# Patient Record
Sex: MALE | Race: WHITE | NOT HISPANIC OR LATINO | Employment: OTHER | URBAN - METROPOLITAN AREA
[De-identification: names, ages, dates, MRNs, and addresses within clinical notes are randomized per-mention and may not be internally consistent; named-entity substitution may affect disease eponyms.]

---

## 2019-01-09 ENCOUNTER — TELEPHONE (OUTPATIENT)
Dept: GASTROENTEROLOGY | Facility: CLINIC | Age: 69
End: 2019-01-09

## 2022-02-23 ENCOUNTER — OFFICE VISIT (OUTPATIENT)
Dept: FAMILY MEDICINE CLINIC | Facility: CLINIC | Age: 72
End: 2022-02-23
Payer: COMMERCIAL

## 2022-02-23 VITALS
TEMPERATURE: 98.5 F | DIASTOLIC BLOOD PRESSURE: 88 MMHG | WEIGHT: 226 LBS | SYSTOLIC BLOOD PRESSURE: 140 MMHG | HEART RATE: 78 BPM | OXYGEN SATURATION: 93 %

## 2022-02-23 DIAGNOSIS — F41.9 ANXIETY: Primary | ICD-10-CM

## 2022-02-23 DIAGNOSIS — R26.89 FUNCTIONAL GAIT ABNORMALITY: ICD-10-CM

## 2022-02-23 DIAGNOSIS — G47.00 INSOMNIA, UNSPECIFIED TYPE: ICD-10-CM

## 2022-02-23 PROCEDURE — 3725F SCREEN DEPRESSION PERFORMED: CPT | Performed by: FAMILY MEDICINE

## 2022-02-23 PROCEDURE — 99203 OFFICE O/P NEW LOW 30 MIN: CPT | Performed by: FAMILY MEDICINE

## 2022-02-23 PROCEDURE — 1036F TOBACCO NON-USER: CPT | Performed by: FAMILY MEDICINE

## 2022-02-23 PROCEDURE — 1160F RVW MEDS BY RX/DR IN RCRD: CPT | Performed by: FAMILY MEDICINE

## 2022-02-23 RX ORDER — ZOLPIDEM TARTRATE 10 MG/1
10 TABLET ORAL
COMMUNITY

## 2022-02-23 RX ORDER — GABAPENTIN 400 MG/1
CAPSULE ORAL
COMMUNITY

## 2022-02-23 RX ORDER — HYDROCODONE BITARTRATE AND ACETAMINOPHEN 10; 325 MG/1; MG/1
TABLET ORAL
COMMUNITY
Start: 2022-01-31
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
## 2022-05-28 ENCOUNTER — HOSPITAL ENCOUNTER (OUTPATIENT)
Dept: RADIOLOGY | Facility: HOSPITAL | Age: 72
Discharge: HOME | End: 2022-05-28

## 2022-05-28 ENCOUNTER — OFFICE VISIT (OUTPATIENT)
Dept: OBGYN CLINIC | Facility: CLINIC | Age: 72
End: 2022-05-28
Payer: COMMERCIAL

## 2022-05-28 VITALS — HEIGHT: 72 IN | BODY MASS INDEX: 28.53 KG/M2 | WEIGHT: 210.6 LBS | HEART RATE: 60 BPM | OXYGEN SATURATION: 99 %

## 2022-05-28 DIAGNOSIS — M25.562 LEFT KNEE PAIN, UNSPECIFIED CHRONICITY: Primary | ICD-10-CM

## 2022-05-28 DIAGNOSIS — M17.12 PRIMARY OSTEOARTHRITIS OF LEFT KNEE: ICD-10-CM

## 2022-05-28 DIAGNOSIS — M25.562 LEFT KNEE PAIN, UNSPECIFIED CHRONICITY: ICD-10-CM

## 2022-05-28 PROBLEM — M86.171 ACUTE OSTEOMYELITIS OF RIGHT FOOT (HCC): Status: ACTIVE | Noted: 2021-08-31

## 2022-05-28 PROBLEM — L03.115 CELLULITIS OF RIGHT LOWER EXTREMITY: Status: ACTIVE | Noted: 2021-08-06

## 2022-05-28 PROBLEM — I49.3 VENTRICULAR PREMATURE BEATS: Status: ACTIVE | Noted: 2022-05-28

## 2022-05-28 PROBLEM — G83.10 MONOPLEGIA OF LOWER EXTREMITY (HCC): Status: ACTIVE | Noted: 2022-05-28

## 2022-05-28 PROBLEM — M25.50 PAIN IN UNSPECIFIED JOINT: Status: ACTIVE | Noted: 2022-05-28

## 2022-05-28 PROBLEM — IMO0002 TEAR OF MEDIAL CARTILAGE OR MENISCUS OF KNEE, CURRENT: Status: ACTIVE | Noted: 2022-05-28

## 2022-05-28 PROBLEM — M53.9 BACK PROBLEM: Status: ACTIVE | Noted: 2022-05-28

## 2022-05-28 PROBLEM — L03.119 CELLULITIS OF LOWER EXTREMITY: Status: ACTIVE | Noted: 2022-05-28

## 2022-05-28 PROBLEM — M25.579 ANKLE PAIN: Status: ACTIVE | Noted: 2022-05-28

## 2022-05-28 PROBLEM — S83.289A TEAR OF LATERAL CARTILAGE OR MENISCUS OF KNEE, CURRENT: Status: ACTIVE | Noted: 2022-05-28

## 2022-05-28 PROBLEM — M16.12 PRIMARY OSTEOARTHRITIS OF LEFT HIP: Status: ACTIVE | Noted: 2019-04-26

## 2022-05-28 PROBLEM — M21.379 FOOT DROP: Status: ACTIVE | Noted: 2021-02-26

## 2022-05-28 PROBLEM — G89.4 CHRONIC PAIN SYNDROME: Status: ACTIVE | Noted: 2022-05-28

## 2022-05-28 PROBLEM — M48.061 SPINAL STENOSIS OF LUMBAR REGION: Status: ACTIVE | Noted: 2019-01-02

## 2022-05-28 PROBLEM — F43.10 POST-TRAUMATIC STRESS DISORDER, UNSPECIFIED: Status: ACTIVE | Noted: 2022-05-28

## 2022-05-28 PROBLEM — H34.8192 CENTRAL RETINAL VEIN OCCLUSION: Status: ACTIVE | Noted: 2022-05-28

## 2022-05-28 PROBLEM — Z71.89 OTHER SPECIFIED COUNSELING: Status: ACTIVE | Noted: 2022-05-28

## 2022-05-28 PROBLEM — E66.9 OBESITY: Status: ACTIVE | Noted: 2022-05-28

## 2022-05-28 PROBLEM — Z01.818 PRE-OP TESTING: Status: ACTIVE | Noted: 2019-01-02

## 2022-05-28 PROBLEM — L03.115 CELLULITIS OF RIGHT LOWER LIMB: Status: ACTIVE | Noted: 2022-05-28

## 2022-05-28 PROBLEM — N81.84 ATROPHY OF SKELETAL MUSCLE OF PELVIS: Status: ACTIVE | Noted: 2022-05-28

## 2022-05-28 PROBLEM — Z96.642 STATUS POST TOTAL HIP REPLACEMENT, LEFT: Status: ACTIVE | Noted: 2019-05-13

## 2022-05-28 PROBLEM — G47.00 INSOMNIA, UNSPECIFIED: Status: ACTIVE | Noted: 2022-05-28

## 2022-05-28 PROBLEM — S81.809A INJURY TO POSTERIOR TIBIAL NERVE: Status: ACTIVE | Noted: 2022-05-28

## 2022-05-28 PROBLEM — G61.82 MULTIFOCAL MOTOR NEUROPATHY (HCC): Status: ACTIVE | Noted: 2022-05-28

## 2022-05-28 PROBLEM — Z71.9 COUNSELING, UNSPECIFIED: Status: ACTIVE | Noted: 2022-05-28

## 2022-05-28 PROBLEM — K08.499 PARTIAL LOSS OF TEETH DUE TO OTHER SPECIFIED CAUSE, UNSPECIFIED CLASS: Status: ACTIVE | Noted: 2022-05-28

## 2022-05-28 PROBLEM — M67.919 DISORDER OF BURSAE AND TENDONS IN SHOULDER REGION: Status: ACTIVE | Noted: 2022-05-28

## 2022-05-28 PROBLEM — G62.89 PERIPHERAL MOTOR NEUROPATHY: Status: ACTIVE | Noted: 2022-05-28

## 2022-05-28 PROBLEM — S84.00XA INJURY TO POSTERIOR TIBIAL NERVE: Status: ACTIVE | Noted: 2022-05-28

## 2022-05-28 PROBLEM — Z87.820 HISTORY OF TRAUMATIC BRAIN INJURY: Status: ACTIVE | Noted: 2022-05-28

## 2022-05-28 PROBLEM — M79.673 PAIN OF FOOT: Status: ACTIVE | Noted: 2022-05-28

## 2022-05-28 PROBLEM — M25.552 PAIN IN LEFT HIP: Status: ACTIVE | Noted: 2022-05-28

## 2022-05-28 PROBLEM — G47.00 INSOMNIA: Status: ACTIVE | Noted: 2022-05-28

## 2022-05-28 PROBLEM — G89.29 CHRONIC PAIN: Status: ACTIVE | Noted: 2022-05-28

## 2022-05-28 PROBLEM — F41.9 ANXIETY DISORDER, UNSPECIFIED: Status: ACTIVE | Noted: 2022-05-28

## 2022-05-28 PROBLEM — M76.899 ENTHESOPATHY OF HIP REGION: Status: ACTIVE | Noted: 2022-05-28

## 2022-05-28 PROBLEM — F06.30 ORGANIC MOOD DISORDER: Status: ACTIVE | Noted: 2022-05-28

## 2022-05-28 PROBLEM — G62.9 NEUROPATHY: Status: ACTIVE | Noted: 2021-02-26

## 2022-05-28 PROBLEM — F39 UNSPECIFIED MOOD (AFFECTIVE) DISORDER (HCC): Status: ACTIVE | Noted: 2022-05-28

## 2022-05-28 PROBLEM — Z87.820 PERSONAL HISTORY OF TRAUMATIC BRAIN INJURY: Status: ACTIVE | Noted: 2022-05-28

## 2022-05-28 PROBLEM — K03.6 DEPOSITS (ACCRETIONS) ON TEETH: Status: ACTIVE | Noted: 2022-05-28

## 2022-05-28 PROBLEM — M21.371 FOOT DROP, RIGHT FOOT: Status: ACTIVE | Noted: 2022-05-28

## 2022-05-28 PROBLEM — S91.301A WOUND OF RIGHT FOOT: Status: ACTIVE | Noted: 2021-08-13

## 2022-05-28 PROBLEM — M71.9 DISORDER OF BURSAE AND TENDONS IN SHOULDER REGION: Status: ACTIVE | Noted: 2022-05-28

## 2022-05-28 PROBLEM — F32.A DEPRESSION, UNSPECIFIED: Status: ACTIVE | Noted: 2022-05-28

## 2022-05-28 PROBLEM — R26.9 GAIT ABNORMALITY: Status: ACTIVE | Noted: 2022-05-28

## 2022-05-28 PROBLEM — S91.301D UNSPECIFIED OPEN WOUND, RIGHT FOOT, SUBSEQUENT ENCOUNTER: Status: ACTIVE | Noted: 2021-08-20

## 2022-05-28 PROBLEM — K02.52 DENTAL CARIES ON PIT AND FISSURE SURFACE PENETRATING INTO DENTIN: Status: ACTIVE | Noted: 2022-05-28

## 2022-05-28 PROBLEM — R53.83 TIRED: Status: ACTIVE | Noted: 2022-05-28

## 2022-05-28 PROBLEM — L84 CLAVI: Status: ACTIVE | Noted: 2021-11-12

## 2022-05-28 PROBLEM — M25.559 HIP JOINT PAIN: Status: ACTIVE | Noted: 2022-05-28

## 2022-05-28 PROBLEM — L08.89 OTHER SPECIFIED LOCAL INFECTIONS OF THE SKIN AND SUBCUTANEOUS TISSUE: Status: ACTIVE | Noted: 2022-05-28

## 2022-05-28 PROCEDURE — 99204 OFFICE O/P NEW MOD 45 MIN: CPT | Performed by: PHYSICIAN ASSISTANT

## 2022-05-28 RX ORDER — LIDOCAINE 50 MG/G
PATCH TOPICAL
COMMUNITY
Start: 2022-05-16

## 2022-05-28 NOTE — PROGRESS NOTES
Assessment/Plan   Diagnoses and all orders for this visit:    Left knee pain, unspecified chronicity  - Suspect early arthritis - xr refused  - Cortisone injection today as this has been helpful in the past  - Offered f/u with one of our orthopedic surgeons, but he already as an appt scheduled with ortho at the [place]  He prefers to do this instead  This is a reasonable plan  He is welcome to call if he does need an appt with on of our doctors  He understands to bring his records including xray reports if he does f/u here  Subjective   Patient ID: Zoë Castro is a 70 y o  male  Vitals:    05/28/22 0831   Pulse: 60   SpO2: 99%     77yo male comes in with his son for an evaluation of his left knee  He has been having ongoing pain in the knee with no specific injury  He is an avid pickleball player and this tends to aggravate it  He is normally treated at the [place] and has an appt coming up, but comes here today because they couldn't get him in for a few weeks  Last night, he reports he had swelling, but today it resolved  The pain is generalized in location  No fevers or chills  He had 3 cortisone injections in the past and they were very helpful  He has not needed one in 6 years  He insists that he just had an xray at the [place] and that it was completely normal   He refuses xrays today  He has a complex medical and neurological history that was reviewed in detail with the patient today  He gets episodes a few times per day where he gets a sharp pain somewhere, his body tenses and tremors, and he cannot speak for a few seconds  He has had an extensive workup and reports he does not have a specific diagnosis yet other than brain injury  This happened once during the visit today  He sat down on a chair and the episode resolved in a few seconds        The following portions of the patient's history were reviewed and updated as appropriate: allergies, current medications, past family history, past medical history, past social history, past surgical history and problem list     Review of Systems  Ortho Exam  Past Medical History:   Diagnosis Date    Cellulitis     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, knee down    Right foot drop      Past Surgical History:   Procedure Laterality Date    HIP SURGERY       History reviewed  No pertinent family history  Social History     Occupational History    Not on file   Tobacco Use    Smoking status: Never Smoker    Smokeless tobacco: Never Used   Vaping Use    Vaping Use: Never used   Substance and Sexual Activity    Alcohol use: Never    Drug use: Never    Sexual activity: Not on file       Review of Systems   Constitutional: Negative  HENT: Negative  Eyes: Negative  Respiratory: Negative  Cardiovascular: Negative  Gastrointestinal: Negative  Endocrine: Negative  Genitourinary: Negative  Musculoskeletal: As below      Allergic/Immunologic: Negative  Neurological: see hpi  Hematological: Negative  Objective   Physical Exam      · Constitutional: Awake, Alert, Oriented    Chronic pain syndrome  · Eyes: EOMI  · Psych: Mood and affect up and down throughout visit  · Heart: regular rate   · Lungs: No audible wheezing  · Abdomen: No guarding  · Lymph: no lymphedema   left Knee:  - Appearance   No swelling, discoloration, deformity, or ecchymosis  - Effusion   trace  - Palpation   No reproducible point tenderness of the quad tendon, pat tendon, patella, med/lat joint lines, mcl, lcl, med/lat tibial plateau  - ROM   Extension: 0 and Flexion: 130  - Special Tests   Blanca's Test negative, Lachman's Test negative, Anterior Drawer Test negative, Posterior Drawer Test negative, Valgus Stress Test negative, Varus Stress Test negative and Patellar apprehension negative  - Motor   normal 5/5 in all planes  - NVI distally on left    Xrays recommended but refused by pt     Procedures

## 2022-06-15 ENCOUNTER — HOSPITAL ENCOUNTER (OUTPATIENT)
Dept: RADIOLOGY | Facility: HOSPITAL | Age: 72
Discharge: HOME | End: 2022-06-15
Payer: COMMERCIAL

## 2022-06-15 ENCOUNTER — OFFICE VISIT (OUTPATIENT)
Dept: OBGYN CLINIC | Facility: HOSPITAL | Age: 72
End: 2022-06-15
Payer: COMMERCIAL

## 2022-06-15 VITALS
SYSTOLIC BLOOD PRESSURE: 153 MMHG | WEIGHT: 210 LBS | DIASTOLIC BLOOD PRESSURE: 87 MMHG | HEART RATE: 58 BPM | BODY MASS INDEX: 28.44 KG/M2 | HEIGHT: 72 IN

## 2022-06-15 DIAGNOSIS — M17.12 PRIMARY OSTEOARTHRITIS OF LEFT KNEE: Primary | ICD-10-CM

## 2022-06-15 DIAGNOSIS — M17.12 PRIMARY OSTEOARTHRITIS OF LEFT KNEE: ICD-10-CM

## 2022-06-15 PROCEDURE — 99213 OFFICE O/P EST LOW 20 MIN: CPT | Performed by: ORTHOPAEDIC SURGERY

## 2022-06-15 PROCEDURE — 73562 X-RAY EXAM OF KNEE 3: CPT

## 2022-06-15 NOTE — PROGRESS NOTES
Orthopaedics Office Visit - Follow up Patient Visit    ASSESSMENT/PLAN:    Assessment:    left knee primary osteoarthritis  Probable medial meniscus tear       Plan:   - Weight bearing as tolerated left lower extremity   - Offered patient physical therapy  Patient declined at this time   - visco ordered for patient   - Offered patient brace for the left knee   - Over the counter analgesics as needed / directed   - Ice / heat as directed   - Discussed possibility of additional imaging in the future pending symptoms next visit   - Follow up for visco left knee       To Do Next Visit:  Baldemar Curet left knee     _____________________________________________________  CHIEF COMPLAINT:  Chief Complaint   Patient presents with    Left Knee - Pain         SUBJECTIVE:  Kirt Hernández is a 70 y o  male who presents to the office for a follow up for his left knee  Patient was seen and evaluated by Antonio Friend  Who diagnosed patient with osteoarthritis of left knee and performed a cortisone injection at that time  Patient states that his knee was doing well for about 5 days and states that after the 5 days his knee pain returned after physical activity  Patient states that he was kneeling on a rock and states he began to experience pain soon after  Patient states his pain is predominantly in the medial aspect of knee becomes worse with physical activity  Patient states that his pain is localized to the knee does not extend to his leg  Patient states he has been taking Motrin/ Tylenol as needed for pain and applying ice the knee with minimal relief of symptoms  Patient denies any popping or locking any but does admit to having some clicking on occasion  Patient offers no other complaints at this time  Patient had x-rays performed by the [place]  But is unable to supply disc of images         PAST MEDICAL HISTORY:  Past Medical History:   Diagnosis Date    Cellulitis     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, knee down    Right foot drop        PAST SURGICAL HISTORY:  Past Surgical History:   Procedure Laterality Date    HIP SURGERY         FAMILY HISTORY:  History reviewed  No pertinent family history  SOCIAL HISTORY:  Social History     Tobacco Use    Smoking status: Never Smoker    Smokeless tobacco: Never Used   Vaping Use    Vaping Use: Never used   Substance Use Topics    Alcohol use: Never    Drug use: Never       MEDICATIONS:    Current Outpatient Medications:     gabapentin (NEURONTIN) 400 mg capsule, Take by mouth, Disp: , Rfl:     HYDROcodone-acetaminophen (NORCO)  mg per tablet, TAKE 1 TABLET BY MOUTH FOUR TIMES A DAY AS NEEDED FOR PAIN  RX MUST LAST UNTIL AT LEAST 3/16/2022 , Disp: , Rfl:     lidocaine (LIDODERM) 5 %, Apply topically, Disp: , Rfl:     zolpidem (AMBIEN) 10 mg tablet, Take 10 mg by mouth, Disp: , Rfl:     ALLERGIES:  No Known Allergies    REVIEW OF SYSTEMS:  MSK: left knee pain   Neuro: WNL   Pertinent items are otherwise noted in HPI  A comprehensive review of systems was otherwise negative  LABS:  HgA1c: No results found for: HGBA1C  BMP: No results found for: GLUCOSE, CALCIUM, NA, K, CO2, CL, BUN, CREATININE  CBC: No components found for: CBC    _____________________________________________________  PHYSICAL EXAMINATION:  Vital signs: /87   Pulse 58   Ht 6' (1 829 m)   Wt 95 3 kg (210 lb)   BMI 28 48 kg/m²   General: No acute distress, awake and alert  Psychiatric: Mood and affect appear appropriate  HEENT: Trachea Midline, No torticollis, no apparent facial trauma  Cardiovascular: No audible murmurs; Extremities appear perfused  Pulmonary: No audible wheezing or stridor  Skin: No open lesions; see further details (if any) below      MUSCULOSKELETAL EXAMINATION:   left knee examination :  - Patient sitting comfortably in the office in no acute distress   -  No acute visible abnormalities present in left knee    Extremity appears well-perfused overall   -   Tenderness to palpation noted over the medial joint line, MCL attachment  No other bony or soft tissue tenderness to palpation noted at this time  -   Full range of motion of the left knee noted with peripatellar crepitus appreciated  - NV intact    ____________________________________________________  STUDIES REVIEWED:  I personally reviewed the images and interpretation is as follows:  Left knee x-ray three views:  Mild / moderate OA medial and patellofemoral joints       PROCEDURES PERFORMED:  No procedures were performed at this time  Ramy Mercer PA-C - assisting  Georges Hair MD                Portions of the record may have been created with voice recognition software  Occasional wrong word or "sound a like" substitutions may have occurred due to the inherent limitations of voice recognition software  Read the chart carefully and recognize, using context, where substitutions have occurred

## 2022-06-15 NOTE — PATIENT INSTRUCTIONS
- Weight bearing as tolerated left lower extremity   - Offered patient physical therapy   Patient declined at this time   - visco ordered for patient   - Offered patient brace for the left knee   - Over the counter analgesics as needed / directed   - Ice / heat as directed   - Discussed possibility of additional imaging in the future pending symptoms next visit   - Follow up for visco left knee

## 2022-06-29 ENCOUNTER — PROCEDURE VISIT (OUTPATIENT)
Dept: OBGYN CLINIC | Facility: HOSPITAL | Age: 72
End: 2022-06-29
Payer: COMMERCIAL

## 2022-06-29 VITALS
HEART RATE: 60 BPM | DIASTOLIC BLOOD PRESSURE: 71 MMHG | HEIGHT: 72 IN | BODY MASS INDEX: 28.44 KG/M2 | SYSTOLIC BLOOD PRESSURE: 125 MMHG | WEIGHT: 210 LBS

## 2022-06-29 DIAGNOSIS — M25.462 EFFUSION OF LEFT KNEE: Primary | ICD-10-CM

## 2022-06-29 DIAGNOSIS — M17.12 PRIMARY OSTEOARTHRITIS OF LEFT KNEE: ICD-10-CM

## 2022-06-29 PROCEDURE — 3008F BODY MASS INDEX DOCD: CPT | Performed by: ORTHOPAEDIC SURGERY

## 2022-06-29 PROCEDURE — 1036F TOBACCO NON-USER: CPT | Performed by: ORTHOPAEDIC SURGERY

## 2022-06-29 PROCEDURE — 1160F RVW MEDS BY RX/DR IN RCRD: CPT | Performed by: ORTHOPAEDIC SURGERY

## 2022-06-29 PROCEDURE — 99213 OFFICE O/P EST LOW 20 MIN: CPT | Performed by: ORTHOPAEDIC SURGERY

## 2022-06-29 PROCEDURE — 20610 DRAIN/INJ JOINT/BURSA W/O US: CPT | Performed by: ORTHOPAEDIC SURGERY

## 2022-06-29 RX ORDER — BUPIVACAINE HYDROCHLORIDE 2.5 MG/ML
4 INJECTION, SOLUTION INFILTRATION; PERINEURAL
Status: COMPLETED | OUTPATIENT
Start: 2022-06-29 | End: 2022-06-29

## 2022-06-29 RX ADMIN — BUPIVACAINE HYDROCHLORIDE 4 ML: 2.5 INJECTION, SOLUTION INFILTRATION; PERINEURAL at 08:48

## 2022-06-29 NOTE — PROGRESS NOTES
Assessment:  1  Effusion of left knee     2  Primary osteoarthritis of left knee         Plan:  Left knee osteoarthritis with effusion  · The patient was aspirated of 25ml of fluid and provided with 5ml of marcaine  He tolerated the procedure well  · Synvisc was not provided as he had visco-supplement 6/23/22 at [place]  · Consider steroid injection at next follow up  · Follow up in 4 weeks with myself or with [place] physician    To do next visit:  Return in about 4 weeks (around 7/27/2022)  The above stated was discussed in layman's terms and the patient expressed understanding  All questions were answered to the patient's satisfaction  Scribe Attestation    I,:  Ada Jordan am acting as a scribe while in the presence of the attending physician :       I,:  Sonya Caldera MD personally performed the services described in this documentation    as scribed in my presence :             Subjective:   Lianna Vanessa is a 70 y o  male who presents for follow up of left knee  He is s/p left knee visco-supplement injection 6/23/22 at the [place]  He was offered arthroscopy at the [place] recently  Today he complains of left medial and generalized knee pain  Most activity can aggravates while rest alleviates  He is active playing pickleball  He has had aspirations and steroid injections in past with benefit  He does use Advil, Aleve, Vicodin and Gabapentin  He does wear a right AFO brace  He denies past left knee surgery  Review of systems negative unless otherwise specified in HPI    Past Medical History:   Diagnosis Date    Cellulitis     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, knee down    Right foot drop        Past Surgical History:   Procedure Laterality Date    HIP SURGERY         History reviewed  No pertinent family history      Social History     Occupational History    Not on file   Tobacco Use    Smoking status: Never Smoker    Smokeless tobacco: Never Used   Vaping Use    Vaping Use: Never used Substance and Sexual Activity    Alcohol use: Never    Drug use: Never    Sexual activity: Not on file         Current Outpatient Medications:     Diclofenac Sodium (VOLTAREN) 1 %, Apply 2 g topically 4 (four) times a day, Disp: 150 g, Rfl: 1    gabapentin (NEURONTIN) 400 mg capsule, Take by mouth, Disp: , Rfl:     HYDROcodone-acetaminophen (NORCO)  mg per tablet, TAKE 1 TABLET BY MOUTH FOUR TIMES A DAY AS NEEDED FOR PAIN  RX MUST LAST UNTIL AT LEAST 3/16/2022 , Disp: , Rfl:     lidocaine (LIDODERM) 5 %, Apply topically, Disp: , Rfl:     zolpidem (AMBIEN) 10 mg tablet, Take 10 mg by mouth, Disp: , Rfl:     No Known Allergies         Vitals:    06/29/22 0812   BP: 125/71   Pulse: 60       Objective:  Physical exam  · General: Awake, Alert, Oriented  · Eyes: Pupils equal, round and reactive to light  · Heart: regular rate and rhythm  · Lungs: No audible wheezing  · Abdomen: soft                    Ortho Exam  Left knee:  Mild varus alignment  TTP over medial joint line  No erythema or ecchymosis  Modest effusion   No swelling  Normal strength  Good ROM with crepitus   Calf compartments soft and supple  Sensation intact  Toes are warm sensate and mobile        Diagnostics, reviewed and taken today if performed as documented: The attending physician has personally reviewed the pertinent films in PACS and interpretation is as follows:  Left knee x-ray of 6/15/2022: Moderate medial and patellofemoral arthritic changes  Procedures, if performed today:    Large joint arthrocentesis: L knee  Universal Protocol:  Consent: Verbal consent obtained  Risks and benefits: risks, benefits and alternatives were discussed  Consent given by: patient  Time out: Immediately prior to procedure a "time out" was called to verify the correct patient, procedure, equipment, support staff and site/side marked as required    Timeout called at: 6/29/2022 8:41 AM   Patient understanding: patient states understanding of the procedure being performed  Patient identity confirmed: verbally with patient    Supporting Documentation  Indications: pain   Procedure Details  Location: knee - L knee  Preparation: Patient was prepped and draped in the usual sterile fashion  Needle size: 22 G  Ultrasound guidance: no  Approach: anterolateral  Medications administered: 4 mL bupivacaine 0 25 %    Aspirate amount: 25 mL  Aspirate: clear and yellow    Patient tolerance: patient tolerated the procedure well with no immediate complications  Dressing:  Sterile dressing applied            Portions of the record may have been created with voice recognition software  Occasional wrong word or "sound a like" substitutions may have occurred due to the inherent limitations of voice recognition software  Read the chart carefully and recognize, using context, where substitutions have occurred

## 2022-06-30 NOTE — TELEPHONE ENCOUNTER
Patient is calling to complain of severe knee pain and swelling after injection yesterday  Please advise      CB # [phone]

## 2022-06-30 NOTE — TELEPHONE ENCOUNTER
Spoke to patient  He stated his knee is swollen and painful  Reports he hasnt done anything OTC to help the pain  He did ice yesterday 2x but it didn't help  Denies redness or heat to touch  Stated the pain is bad and he needs to be seen today either by us or the [place]  Advised Dr Lars Russell is in the OR today  Advised ice 20 mins on 20 mins off and elevation  OTC tylenol as recommended on the bottle and advil or aleve, whichever he prefers  Advised it is not uncommon for the fluid to return after an aspiration  Patient verbalized understanding  Please advise

## 2022-07-20 ENCOUNTER — HOSPITAL ENCOUNTER (OUTPATIENT)
Dept: RADIOLOGY | Age: 72
Discharge: HOME | End: 2022-07-20
Payer: COMMERCIAL

## 2022-07-20 DIAGNOSIS — M25.562 LEFT KNEE PAIN, UNSPECIFIED CHRONICITY: ICD-10-CM

## 2022-07-20 PROCEDURE — 73721 MRI JNT OF LWR EXTRE W/O DYE: CPT

## 2022-07-29 ENCOUNTER — OFFICE VISIT (OUTPATIENT)
Dept: OBGYN CLINIC | Facility: HOSPITAL | Age: 72
End: 2022-07-29
Payer: COMMERCIAL

## 2022-07-29 VITALS
HEART RATE: 73 BPM | SYSTOLIC BLOOD PRESSURE: 167 MMHG | DIASTOLIC BLOOD PRESSURE: 82 MMHG | BODY MASS INDEX: 28.44 KG/M2 | WEIGHT: 210 LBS | HEIGHT: 72 IN

## 2022-07-29 DIAGNOSIS — M17.12 PRIMARY OSTEOARTHRITIS OF LEFT KNEE: Primary | ICD-10-CM

## 2022-07-29 PROCEDURE — 99213 OFFICE O/P EST LOW 20 MIN: CPT | Performed by: ORTHOPAEDIC SURGERY

## 2022-07-29 PROCEDURE — 20610 DRAIN/INJ JOINT/BURSA W/O US: CPT | Performed by: ORTHOPAEDIC SURGERY

## 2022-07-29 RX ORDER — BETAMETHASONE SODIUM PHOSPHATE AND BETAMETHASONE ACETATE 3; 3 MG/ML; MG/ML
12 INJECTION, SUSPENSION INTRA-ARTICULAR; INTRALESIONAL; INTRAMUSCULAR; SOFT TISSUE
Status: COMPLETED | OUTPATIENT
Start: 2022-07-29 | End: 2022-07-29

## 2022-07-29 RX ORDER — BUPIVACAINE HYDROCHLORIDE 2.5 MG/ML
2 INJECTION, SOLUTION INFILTRATION; PERINEURAL
Status: COMPLETED | OUTPATIENT
Start: 2022-07-29 | End: 2022-07-29

## 2022-07-29 RX ADMIN — BUPIVACAINE HYDROCHLORIDE 2 ML: 2.5 INJECTION, SOLUTION INFILTRATION; PERINEURAL at 08:21

## 2022-07-29 RX ADMIN — BETAMETHASONE SODIUM PHOSPHATE AND BETAMETHASONE ACETATE 12 MG: 3; 3 INJECTION, SUSPENSION INTRA-ARTICULAR; INTRALESIONAL; INTRAMUSCULAR; SOFT TISSUE at 08:21

## 2022-07-29 NOTE — PROGRESS NOTES
Orthopaedics Office Visit - follow up Patient Visit    ASSESSMENT/PLAN:    Assessment:   Left knee osteoarthritis   Left medial meniscus tear       Plan:   - Discussed conservative and surgical intervention with patient at length  Patient opted for surgical intervention at this time  - Offered patient cortisone injection  Patient accepted and tolerated well  - Over the counter analgesics as needed / directed   - Ice / heat as directed   - [facility]   - Follow up prn      To Do Next Visit:  PRN     _____________________________________________________  CHIEF COMPLAINT:  Chief Complaint   Patient presents with    Left Knee - Follow-up         SUBJECTIVE:  Jenifer Kirkpatrick is a 70 y o  male who presents  To the office for a follow-up evaluation of his left knee  Patient is 1 month status post  Aspiration of the left knee  Patient underwent an MRI of the left knee recently  Patient states that he continues to have pain on the medial aspect of the knees becomes worse with range of motion of the knee and weight-bearing/ activities  Patient states he has been taking over-the-counter pain medication with minimal relief of symptoms  Patient has been chronically taking hydrocodone for the past 20 years in duration  Patient denies any popping locking or clicking in the knee and describes his  Symptoms more as pain  Patient offers no other complaints at this time  s/p left knee visco-supplement injection 6/23/22 at the [place]  PAST MEDICAL HISTORY:  Past Medical History:   Diagnosis Date    Cellulitis     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, knee down    Right foot drop        PAST SURGICAL HISTORY:  Past Surgical History:   Procedure Laterality Date    HIP SURGERY         FAMILY HISTORY:  History reviewed  No pertinent family history      SOCIAL HISTORY:  Social History     Tobacco Use    Smoking status: Never Smoker    Smokeless tobacco: Never Used Vaping Use    Vaping Use: Never used   Substance Use Topics    Alcohol use: Never    Drug use: Never       MEDICATIONS:    Current Outpatient Medications:     Diclofenac Sodium (VOLTAREN) 1 %, Apply 2 g topically 4 (four) times a day, Disp: 150 g, Rfl: 1    gabapentin (NEURONTIN) 400 mg capsule, Take by mouth, Disp: , Rfl:     HYDROcodone-acetaminophen (NORCO)  mg per tablet, TAKE 1 TABLET BY MOUTH FOUR TIMES A DAY AS NEEDED FOR PAIN  RX MUST LAST UNTIL AT LEAST 3/16/2022 , Disp: , Rfl:     lidocaine (LIDODERM) 5 %, Apply topically, Disp: , Rfl:     zolpidem (AMBIEN) 10 mg tablet, Take 10 mg by mouth, Disp: , Rfl:     ALLERGIES:  No Known Allergies    REVIEW OF SYSTEMS:  MSK: left knee pain   Neuro: WNL   Pertinent items are otherwise noted in HPI  A comprehensive review of systems was otherwise negative  LABS:  HgA1c: No results found for: HGBA1C  BMP: No results found for: GLUCOSE, CALCIUM, NA, K, CO2, CL, BUN, CREATININE  CBC: No components found for: CBC    _____________________________________________________  PHYSICAL EXAMINATION:  Vital signs: There were no vitals taken for this visit  General: No acute distress, awake and alert  Psychiatric: Mood and affect appear appropriate  HEENT: Trachea Midline, No torticollis, no apparent facial trauma  Cardiovascular: No audible murmurs;  Extremities appear perfused  Pulmonary: No audible wheezing or stridor  Skin: No open lesions; see further details (if any) below      MUSCULOSKELETAL EXAMINATION:  Ortho Exam  Left knee:  Mild varus alignment  TTP over medial joint line no other bony or soft tissue ttp noted   No erythema or ecchymosis  Mild effusion   Normal strength  Good ROM with crepitus   Calf compartments soft and supple  Sensation intact  Toes are warm sensate and mobile     _____________________________________________________  STUDIES REVIEWED:  I personally reviewed the images and interpretation is as follows:  N/A       PROCEDURES PERFORMED:  Large joint arthrocentesis: L knee  Universal Protocol:  Consent: Verbal consent obtained  Risks and benefits: risks, benefits and alternatives were discussed  Consent given by: patient  Patient understanding: patient states understanding of the procedure being performed  Site marked: the operative site was marked  Patient identity confirmed: verbally with patient    Supporting Documentation  Indications: pain   Procedure Details  Location: knee - L knee  Preparation: Patient was prepped and draped in the usual sterile fashion  Needle size: 22 G  Ultrasound guidance: no  Approach: anterolateral  Medications administered: 2 mL bupivacaine 0 25 %; 12 mg betamethasone acetate-betamethasone sodium phosphate 6 (3-3) mg/mL    Patient tolerance: patient tolerated the procedure well with no immediate complications  Dressing:  Sterile dressing applied              Lorna Serrano PA-C  - assisting  [place], MD            Portions of the record may have been created with voice recognition software  Occasional wrong word or "sound a like" substitutions may have occurred due to the inherent limitations of voice recognition software  Read the chart carefully and recognize, using context, where substitutions have occurred

## 2022-08-01 ENCOUNTER — TELEPHONE (OUTPATIENT)
Dept: OBGYN CLINIC | Facility: OTHER | Age: 72
End: 2022-08-01

## 2022-08-01 NOTE — TELEPHONE ENCOUNTER
Patient called in , confused as to why surgery was not scheduled  Last note says "patient opted for surgery"    But then it says [facility] with [facility]  Which then he erma tt the [place] today and they told him to follow up us for surgery  Dr Lewis Rather are you okay with doing this surgery or does it need to go thru [place]?     C/b # [phone]
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
## 2024-03-20 ENCOUNTER — TELEPHONE (OUTPATIENT)
Dept: FAMILY MEDICINE CLINIC | Facility: CLINIC | Age: 74
End: 2024-03-20

## 2024-07-15 ENCOUNTER — TELEPHONE (OUTPATIENT)
Dept: FAMILY MEDICINE CLINIC | Facility: CLINIC | Age: 74
End: 2024-07-15

## 2024-07-15 NOTE — TELEPHONE ENCOUNTER
Patient due for Annual Physical. Never done at this practice.    Last visit on 2/23/2022. Reminder call made today 07/15/24 and did not answer. Message left on voice mail to call us back for scheduling an appointment.